# Patient Record
Sex: FEMALE | Race: WHITE | URBAN - METROPOLITAN AREA
[De-identification: names, ages, dates, MRNs, and addresses within clinical notes are randomized per-mention and may not be internally consistent; named-entity substitution may affect disease eponyms.]

---

## 2020-10-16 NOTE — ASU PATIENT PROFILE, ADULT - PSH
H/O total knee replacement, right    H/O: hysterectomy    History of  section    Status post cholecystectomy

## 2020-10-17 ENCOUNTER — OUTPATIENT (OUTPATIENT)
Dept: OUTPATIENT SERVICES | Facility: HOSPITAL | Age: 65
LOS: 1 days | End: 2020-10-17
Payer: MEDICARE

## 2020-10-17 DIAGNOSIS — Z11.59 ENCOUNTER FOR SCREENING FOR OTHER VIRAL DISEASES: ICD-10-CM

## 2020-10-17 LAB — SARS-COV-2 RNA SPEC QL NAA+PROBE: SIGNIFICANT CHANGE UP

## 2020-10-17 PROCEDURE — U0003: CPT

## 2020-10-19 ENCOUNTER — OUTPATIENT (OUTPATIENT)
Dept: OUTPATIENT SERVICES | Facility: HOSPITAL | Age: 65
LOS: 1 days | End: 2020-10-19
Payer: MEDICARE

## 2020-10-19 VITALS
RESPIRATION RATE: 12 BRPM | HEIGHT: 64.5 IN | TEMPERATURE: 98 F | SYSTOLIC BLOOD PRESSURE: 129 MMHG | WEIGHT: 230.6 LBS | DIASTOLIC BLOOD PRESSURE: 60 MMHG | OXYGEN SATURATION: 100 % | HEART RATE: 53 BPM

## 2020-10-19 VITALS
DIASTOLIC BLOOD PRESSURE: 68 MMHG | SYSTOLIC BLOOD PRESSURE: 121 MMHG | RESPIRATION RATE: 20 BRPM | HEART RATE: 58 BPM | OXYGEN SATURATION: 100 %

## 2020-10-19 DIAGNOSIS — H31.422 SEROUS CHOROIDAL DETACHMENT, LEFT EYE: ICD-10-CM

## 2020-10-19 DIAGNOSIS — Z90.710 ACQUIRED ABSENCE OF BOTH CERVIX AND UTERUS: Chronic | ICD-10-CM

## 2020-10-19 DIAGNOSIS — Z90.49 ACQUIRED ABSENCE OF OTHER SPECIFIED PARTS OF DIGESTIVE TRACT: Chronic | ICD-10-CM

## 2020-10-19 DIAGNOSIS — Z98.891 HISTORY OF UTERINE SCAR FROM PREVIOUS SURGERY: Chronic | ICD-10-CM

## 2020-10-19 DIAGNOSIS — Z96.651 PRESENCE OF RIGHT ARTIFICIAL KNEE JOINT: Chronic | ICD-10-CM

## 2020-10-19 PROCEDURE — 67108 REPAIR DETACHED RETINA: CPT | Mod: LT

## 2020-10-19 PROCEDURE — C1784: CPT

## 2020-10-19 PROCEDURE — C1889: CPT

## 2020-10-19 NOTE — ASU DISCHARGE PLAN (ADULT/PEDIATRIC) - NO HEAVY LIFTING, STRAINING, OR BENDING
HPI  Kiera Dobson is a 49 year old female with type 1 diabetes mellitus here today for a follow up visit.  She has mild/moderate nonproliferative diabetic retinopathy. No peripheral neuropathy or nephropathy.  Her hx is also significant for hypoglycemia unawareness.  She denies any recent severe hypoglycemia.  She has been using her Medtronic 530G insulin pump and Dexcom.  Her current basal insulin rates are:  Midnight= 0.85 units/hr.  3 am = 0.65 units/hr.  10 am  = 0.65 units/hr.  2200= 0.5 units/hr.  Pt's I/C ratio is 1:12.  Sensitivity is 70.  Her 24 hr basal dose is 15.9 units/24 hrs.  Pt's A1C is 7.9 % today and her previous A1C was 7.7 % in 6/2017.  We downloaded pt's insulin pump and Dexcom today.  Kiera need to work on checking her blood sugar more frequent.  She has only been averaging 1-2 blood sugar checks per day.  Her blood sugar readings are erratic.  Most of her blood sugar values are high.  Her average glucose was 169 with SD 49 over the past month.  Her Dexcom download did not show any overnight hypoglycemia.  On ROS today, she stopped taking Prozac since she felt that the Prozac was making her very tired.  She did talk to her therapist about this med change.  No visual changes.  She has gained weight.  Kiera reports leg, back and hip pain. She has been told she has arthritis.  No numbness in her feet.  Pt denies n/v, SOB or cough.No chest pain at his time or abd pain or diarrhea.    DIABETES CARE:  Retinopathy:mild/moderate nonproliferative diabetic retinopathy.  She was last seen here by Oph in July 2017.   Nephropathy: urine microalbuminuria negative in 2/2017. Neuropathy: none.  Feet: good pulses, no ulcers and normal monofilamentous exam.  Taking ASA: yes.  Lipids:  LDL 69 on 3/24/2017.    Pt is taking Simvastatin.    ROS  Please see under HPI.    Allergies  Allergies   Allergen Reactions     Ampicillin Sodium Rash       Medications  Current Outpatient Prescriptions   Medication Sig Dispense  Refill     simvastatin (ZOCOR) 80 MG tablet Take 1 tablet (80 mg) by mouth At Bedtime 90 tablet 3     blood glucose monitoring (ERICA CONTOUR NEXT) test strip Use to test blood sugar 10  times daily or as directed. 300 strip 11     insulin aspart (NOVOLOG VIAL) 100 UNITS/ML VIAL Use in insulin pump.  Pt uses appox 50-60 units in 24 hrs. 60 mL 3     aspirin 81 MG chewable tablet Take 81 mg by mouth every other day  108 tablet 3     glucose ([facility] GLUCOSE) 40 % GEL Take 15 g by mouth as needed       glucose blood VI test strips strip Test up to ten times per day as directed 5 Box 8     glucagon (GLUCAGON EMERGENCY) 1 MG injection Inject 1 mg into the muscle once for 1 dose Use as directed 1 mg 2       Family History  family history includes Alzheimer Disease in her father; Autoimmune Disease in her mother; CANCER in her father; DIABETES in her father; Glaucoma in her father and mother; Hypertension in her mother.    Social History  Smoke: none.  ETOH: rare.   with children.  Pt works full time in a lab.    Past Medical History  Past Medical History:   Diagnosis Date     Diabetes mellitus (H)     Type 1     Elevated cholesterol      Glaucoma     Glaucoma suspect     Glaucoma suspect      Kidney stones      Nonsenile cataract      Past Surgical History:   Procedure Laterality Date     APPENDECTOMY OPEN  1981     BIOPSY OF SKIN LESION  12/30/2011    returned January 2012 for additional removal     c sections  99,97,08     EXTRACORPOREAL SHOCK WAVE LITHOTRIPSY, CYSTOSCOPY, INSERT STENT URETER(S), COMBINED  2004     LASER HOLMIUM LITHOTRIPSY URETER(S), INSERT STENT, COMBINED  12/12/2013    Procedure: COMBINED CYSTOSCOPY, URETEROSCOPY, LASER HOLMIUM LITHOTRIPSY URETER(S), INSERT STENT;  Right Ureteroscopy Holmium Laser Lithotripsy Stent Placement;  Surgeon: Kirill Marlow MD;  Location: [facility] OR     S/P right shoulder surgery in Dec 2012.    Physical Exam  BP 98/66 (BP Location: Right arm, Patient Position:  "Sitting, Cuff Size: Adult Regular)  Pulse 60  Ht 1.518 m (4' 11.75\")  Wt 66.3 kg (146 lb 3.2 oz)  BMI 28.79 kg/m2  Body mass index is 28.79 kg/(m^2).    HEENT:  PERRLA; fundi not examined.  NECK: Thyroid nontender.  LUNGS: Clear b/l.  CARDIAC:  RRR.  ABDOMEN: Insulin infusion set and dexcom intact. Nontender.  Some areas of lipohypertrophy.  EXTREMITIES: No edema. Equal strength in LE's b/l.  FEET: Warm, no ulcers and normal monofilamentous exam.    RESULTS  Creatinine   Date Value Ref Range Status   02/14/2017 0.62 0.52 - 1.04 mg/dL Final     GFR Estimate   Date Value Ref Range Status   02/14/2017 >90  Non  GFR Calc   >60 mL/min/1.7m2 Final     Hemoglobin A1C   Date Value Ref Range Status   07/16/2013 7.9 (A) 4.3 - 6.0 % Final     Potassium   Date Value Ref Range Status   02/14/2017 4.6 3.4 - 5.3 mmol/L Final     ALT   Date Value Ref Range Status   02/14/2017 17 0 - 50 U/L Final     AST   Date Value Ref Range Status   02/14/2017 13 0 - 45 U/L Final     TSH   Date Value Ref Range Status   02/14/2017 1.57 0.40 - 4.00 mU/L Final     T4 Free   Date Value Ref Range Status   02/14/2017 0.84 0.76 - 1.46 ng/dL Final       Cholesterol   Date Value Ref Range Status   03/24/2017 154 <200 mg/dL Final   02/10/2016 190 <200 mg/dL Final     HDL Cholesterol   Date Value Ref Range Status   03/24/2017 73 >49 mg/dL Final   02/10/2016 78 >49 mg/dL Final     LDL Cholesterol Calculated   Date Value Ref Range Status   03/24/2017 69 <100 mg/dL Final     Comment:     Desirable:       <100 mg/dl   02/10/2016 101 (H) <100 mg/dL Final     Comment:     Above desirable:  100-129 mg/dl   Borderline High:  130-159 mg/dL   High:             160-189 mg/dL   Very high:       >189 mg/dl       Triglycerides   Date Value Ref Range Status   03/24/2017 59 <150 mg/dL Final   02/10/2016 58 <150 mg/dL Final     Comment:     Fasting specimen     Cholesterol/HDL Ratio   Date Value Ref Range Status   08/18/2014 2.7 0.0 - 5.0 Final "   09/17/2013 2.3 0.0 - 5.0 Final     A1C      7.9   9/13/2017  A1C      7.7   6/1/2017  A1C      8.3   2/14/2017  A1C      7.5   8/18/2016  A1C      7.6   5/5/2016  A1C      7.8    2/10/2016  A1C      7.9    9/2/2015  A1C      7.7    5/2015  A1C      8.6    1/28/2015  A1C      8.8    9/26/2014  A1C      8.0    7/2/2014  A1C      8.4    2/28/2014    ASSESSMENT/PLAN:    1.  TYPE 1 DIABETES MELLITUS:Type 1 diabetes mellitus complicated by retinopathy and hypoglycemia unawareness.  Kiera stopped taking her Prozac and has been checking her blood sugars less.  I asked her to see her therapist again and to work on checking her blood sugars fasting each day, at noon, predinner and at bedtime daily.  No change in insulin doses today.  She remains normotensive.   Feet in good condition.  She is taking ASA daily.  Pt's TSH is normal in Feb 2017.    2.  MILD/MODERATE NPDR: Last seen by Oph here in July 2017.    3.  HX OF + MICROALBUMINURIA:  Pt's microalbuminuria negative in 2/2017.  Her creat/GFR are stable.    4. HYPERLIPIDEMIA:  LDL 69 in 3/2017.  Pt remains on high dose Simvastatin.  Since she has been on the high dose Simvastatin for years and tolerating this dose, Dr. Chahal would like her to remain on Simvastatin 80 mg daily.  Pt's hepatic panel and CK were normal in 2/2017.    5.  ADHD: She is being followed by [facility] ( [phone] ).  I asked her to see her therapist soon.  Kiera is finding it difficult again to focus on her diabetes care since she stopped the Prozac.    6.  TINNITUS: Seen here by ENT dx hearing loss.    7. HYPOGLYCEMIA UNAWARENESS: She denies having any recent severe hypoglycemia.  Continue to wear Dexcom daily.    8. Return to [facility] to see Dr. Chahal in    Statement Selected

## 2020-10-19 NOTE — ASU DISCHARGE PLAN (ADULT/PEDIATRIC) - PATIENT EDUCATION MATERIALS PROVIED
[employer] gas bubble card, Eye shield instructions and eye kit given to patient/Implant card (specify)/Other (specify)

## 2020-10-19 NOTE — ASU DISCHARGE PLAN (ADULT/PEDIATRIC) - ASU DC SPECIAL INSTRUCTIONSFT
tylenol  (acetaminophen) for pain  leave on eye patch  Position face down during the day  Sleep on either side  Do NOT sleep on back

## 2020-10-19 NOTE — ASU DISCHARGE PLAN (ADULT/PEDIATRIC) - PROCEDURE
left eye pars plana vitrectomy,drainage of choroidal fluid,perfluoron, endolaser, C3F8 gas injection

## 2020-10-19 NOTE — ASU DISCHARGE PLAN (ADULT/PEDIATRIC) - CALL YOUR DOCTOR IF YOU HAVE ANY OF THE FOLLOWING:
Fever greater than (need to indicate Fahrenheit or Celsius)/Bleeding that does not stop/Pain not relieved by Medications/Swelling that gets worse/Nausea and vomiting that does not stop